# Patient Record
Sex: FEMALE | Race: WHITE | HISPANIC OR LATINO
[De-identification: names, ages, dates, MRNs, and addresses within clinical notes are randomized per-mention and may not be internally consistent; named-entity substitution may affect disease eponyms.]

---

## 2024-09-09 PROBLEM — Z00.00 ENCOUNTER FOR PREVENTIVE HEALTH EXAMINATION: Status: ACTIVE | Noted: 2024-09-09

## 2024-09-12 ENCOUNTER — APPOINTMENT (OUTPATIENT)
Dept: ORTHOPEDIC SURGERY | Facility: CLINIC | Age: 44
End: 2024-09-12
Payer: MEDICAID

## 2024-09-12 ENCOUNTER — NON-APPOINTMENT (OUTPATIENT)
Age: 44
End: 2024-09-12

## 2024-09-12 DIAGNOSIS — S62.341A NONDISPLACED FRACTURE OF BASE OF SECOND METACARPAL BONE, LEFT HAND, INITIAL ENCOUNTER FOR CLOSED FRACTURE: ICD-10-CM

## 2024-09-12 PROCEDURE — 29125 APPL SHORT ARM SPLINT STATIC: CPT | Mod: LT

## 2024-09-12 PROCEDURE — 99203 OFFICE O/P NEW LOW 30 MIN: CPT | Mod: 25

## 2024-09-12 PROCEDURE — 73110 X-RAY EXAM OF WRIST: CPT | Mod: LT

## 2024-09-12 NOTE — HISTORY OF PRESENT ILLNESS
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 Reports that she went to [facility] on DOI => XR, splint. Patient removed splint, presents in wrist wrap. Denies numbness, tingling, radiation, prior injury. +Motrin.    PMH: on Phentermine.  [FreeTextEntry5] : DON stearns [RHD] 43 year old female is here today c/o LEFT wrist pain since 09/07/24 after falling and landing on top of hand. went to [facility] on DOI +xrays (images unavailable) c/o pain with lifting ang gripping. denies numbness, tingling and prior injury.  Fall

## 2024-09-12 NOTE — ASSESSMENT
[FreeTextEntry1] : The condition was explained to the patient. Discussed risks and benefits of surgical vs non-surgical treatment. We discussed that although there may be some imperfect alignment on X-ray, good function is more important than good X-rays, and that performing surgery may lead to unnecessary scar tissue formation. We did discuss the goals of surgery when indicated for malrotation or extreme angulation/displacement and what to expect.  Risks of treatment include, but are not limited to persistent pain, stiffness, fracture displacement, need for future surgery, mal-union, non-union. All patient questions were answered. Patient expressed understanding and would like to proceed with the non-operative treatment plan.   - I personally applied an orthoglass (pre-padded fiberglass) short arm volar splint including wrist and MPJ. Reviewed splint care instructions - do not remove splint, do not get splint wet, do not put objects down splint. We discussed that there is a moderate risk of complications and morbidity with splinting, including skin burn, skin irritation, skin breakdown, and stiffness from immobilization. They expressed understanding. - elevate hand above level of heart as much as possible to reduce swelling. - NWB to L hand. no gym/sports. - discussed smoking cessation, as smoking/nicotine decreases blood flow, and impairs healing. patient vapes nicotine.   F/u 2wks. X-rays of L hand out of splint.

## 2024-09-12 NOTE — IMAGING
[de-identified] : LEFT HAND skin intact. mild swelling of hand. TTP to 2nd metacarpal. min TTP to 3/4 metacarpal, dorsal wrist. wrist ROM: mild limited extension, flexion. + mild dorsal wrist pain at end flexion, volar wrist pain at end extension. good EPL, FPL. other fingers good extension, flex to full fist. good finger abduction and adduction. no scissoring. SILT to median, ulnar, radial distribution. palpable radial pulse, brisk cap refill all digits. no triggering.   XRAYS OF LEFT WRIST (3 views - PA, OBLIQUE, AND LATERAL VIEWS): non-displaced 2nd metacarpal base fx.

## 2024-09-26 ENCOUNTER — APPOINTMENT (OUTPATIENT)
Dept: ORTHOPEDIC SURGERY | Facility: CLINIC | Age: 44
End: 2024-09-26
Payer: MEDICAID

## 2024-09-26 VITALS — HEIGHT: 61 IN | WEIGHT: 150 LBS | BODY MASS INDEX: 28.32 KG/M2

## 2024-09-26 DIAGNOSIS — S62.341A NONDISPLACED FRACTURE OF BASE OF SECOND METACARPAL BONE, LEFT HAND, INITIAL ENCOUNTER FOR CLOSED FRACTURE: ICD-10-CM

## 2024-09-26 PROCEDURE — 99212 OFFICE O/P EST SF 10 MIN: CPT

## 2024-09-26 PROCEDURE — 73130 X-RAY EXAM OF HAND: CPT | Mod: LT

## 2024-09-26 NOTE — HISTORY OF PRESENT ILLNESS
[de-identified] : 9/26/24: 2.5 weeks s/p LEFT 2nd metacarpal base fx from 9/7/24. in radial gutter splint. able to do more with hand, but still has pain with pushing, pulling, twisting.  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 Reports that she went to [facility] on DOI => XR, splint. Patient removed splint, presents in wrist wrap. Denies numbness, tingling, radiation, prior injury. +Motrin.    PMH: on Phentermine.

## 2024-09-26 NOTE — ASSESSMENT
[FreeTextEntry1] : - recommend wrist brace, full time except hygiene. I personally applied a new orthoglass (pre-padded fiberglass) short arm volar splint including wrist and 2nd MPJ in the interim. - elevate hand above level of heart as much as possible to reduce swelling. - NWB to L hand. no gym/sports. - Work: no lifting >5lbs.  F/u 3wks. X-rays of L hand.

## 2024-09-26 NOTE — IMAGING
[de-identified] : LEFT HAND skin intact. mild swelling of dorsal radial wrist. good EPL, FPL. other fingers good extension, flex to full fist. good finger abduction and adduction. no scissoring. SILT to median, ulnar, radial distribution. palpable radial pulse, brisk cap refill all digits. no triggering.   XRAYS OF LEFT WRIST (3 views - PA, OBLIQUE, AND LATERAL VIEWS): stable position/alignment of 2nd metacarpal base fx.

## 2024-10-17 ENCOUNTER — APPOINTMENT (OUTPATIENT)
Dept: ORTHOPEDIC SURGERY | Facility: CLINIC | Age: 44
End: 2024-10-17
Payer: MEDICAID

## 2024-10-17 ENCOUNTER — NON-APPOINTMENT (OUTPATIENT)
Age: 44
End: 2024-10-17

## 2024-10-17 DIAGNOSIS — S62.341A NONDISPLACED FRACTURE OF BASE OF SECOND METACARPAL BONE, LEFT HAND, INITIAL ENCOUNTER FOR CLOSED FRACTURE: ICD-10-CM

## 2024-10-17 PROCEDURE — 99212 OFFICE O/P EST SF 10 MIN: CPT

## 2024-10-17 PROCEDURE — 73130 X-RAY EXAM OF HAND: CPT | Mod: LT
